# Patient Record
Sex: FEMALE | URBAN - METROPOLITAN AREA
[De-identification: names, ages, dates, MRNs, and addresses within clinical notes are randomized per-mention and may not be internally consistent; named-entity substitution may affect disease eponyms.]

---

## 2024-08-20 ENCOUNTER — OFFICE VISIT (OUTPATIENT)
Dept: URBAN - METROPOLITAN AREA CLINIC 113 | Facility: CLINIC | Age: 69
End: 2024-08-20

## 2024-09-06 ENCOUNTER — OFFICE VISIT (OUTPATIENT)
Dept: URBAN - METROPOLITAN AREA CLINIC 113 | Facility: CLINIC | Age: 69
End: 2024-09-06
Payer: MEDICARE

## 2024-09-06 ENCOUNTER — DASHBOARD ENCOUNTERS (OUTPATIENT)
Age: 69
End: 2024-09-06

## 2024-09-06 ENCOUNTER — LAB OUTSIDE AN ENCOUNTER (OUTPATIENT)
Dept: URBAN - METROPOLITAN AREA CLINIC 113 | Facility: CLINIC | Age: 69
End: 2024-09-06

## 2024-09-06 VITALS
HEART RATE: 67 BPM | HEIGHT: 66 IN | SYSTOLIC BLOOD PRESSURE: 113 MMHG | TEMPERATURE: 97.3 F | WEIGHT: 148 LBS | BODY MASS INDEX: 23.78 KG/M2 | RESPIRATION RATE: 18 BRPM | DIASTOLIC BLOOD PRESSURE: 73 MMHG

## 2024-09-06 DIAGNOSIS — K92.1 HEMATOCHEZIA: ICD-10-CM

## 2024-09-06 DIAGNOSIS — R19.5 POSITIVE FIT (FECAL IMMUNOCHEMICAL TEST): ICD-10-CM

## 2024-09-06 PROBLEM — 449341000124102: Status: ACTIVE | Noted: 2024-09-06

## 2024-09-06 PROBLEM — 59614000: Status: ACTIVE | Noted: 2024-09-06

## 2024-09-06 PROCEDURE — 99204 OFFICE O/P NEW MOD 45 MIN: CPT | Performed by: NURSE PRACTITIONER

## 2024-09-06 RX ORDER — EFAVIRENZ, EMTRICITABINE AND TENOFOVIR DISOPROXIL FUMARATE 600; 200; 300 MG/1; MG/1; MG/1
1 TABLET ON AN EMPTY STOMACH TABLET, FILM COATED ORAL ONCE A DAY
Status: ACTIVE | COMMUNITY

## 2024-09-06 RX ORDER — ATENOLOL 25 MG/1
1 TABLET TABLET ORAL ONCE A DAY
Status: ACTIVE | COMMUNITY

## 2024-09-06 RX ORDER — SODIUM PICOSULFATE, MAGNESIUM OXIDE, AND ANHYDROUS CITRIC ACID 12; 3.5; 1 G/175ML; G/175ML; MG/175ML
175 ML THE FIRST DOSE THE EVENING BEFORE AND SECOND DOSE THE MORNING OF COLONOSCOPY LIQUID ORAL ONCE A DAY
Qty: 350 | OUTPATIENT
Start: 2024-09-06 | End: 2024-09-08

## 2024-09-06 RX ORDER — LOVASTATIN 40 MG/1
1 TABLET WITH THE EVENING MEAL TABLET ORAL ONCE A DAY
Status: ACTIVE | COMMUNITY

## 2024-09-06 RX ORDER — IBUPROFEN 800 MG/1
1 TABLET WITH FOOD OR MILK AS NEEDED TABLET, FILM COATED ORAL
Status: ACTIVE | COMMUNITY

## 2024-09-06 RX ORDER — CETIRIZINE HYDROCHLORIDE 10 MG/1
1 TABLET TABLET, FILM COATED ORAL ONCE A DAY
Status: ACTIVE | COMMUNITY

## 2024-09-06 NOTE — HPI-TODAY'S VISIT:
This is a 68-year-old female with a history of HIV, prediabetes, hyperlipidemia, and chronic pain referred from Ms. Benjamin NP for a positive fit test/colon cancer screening.  She denies a family history of colon cancer. She has not had a colonoscopy. For the last 6 months, she has noticed intermittent bleeding describing red blood on the tissue or darker blood and clots with her bowel movement. She may have passed only blood. This does not occur on a daily basis. She has a bowel movement every morning. Recently, her stools have been loose. For the last 2 months, she has experienced lower abdominal cramps after her bowel movements similar to menstrual cramps. She denies proctalgia, any other abdominal symptoms, denies a weight loss, and denies NSAID use.  She denies a history of smoking-related lung disease/COPD. She is not under a pulmonologist care. She has an occasional cough and occasional wheezing. She denies shortness of breath.

## 2024-09-07 LAB
ABSOLUTE BASOPHILS: 41
ABSOLUTE EOSINOPHILS: 92
ABSOLUTE LYMPHOCYTES: 3080
ABSOLUTE MONOCYTES: 510
ABSOLUTE NEUTROPHILS: 1377
BASOPHILS: 0.8
EOSINOPHILS: 1.8
HEMATOCRIT: 40.6
HEMOGLOBIN: 13.6
LYMPHOCYTES: 60.4
MCH: 33.7
MCHC: 33.5
MCV: 100.5
MONOCYTES: 10
MPV: 9.4
NEUTROPHILS: 27
PLATELET COUNT: 309
RDW: 11.7
RED BLOOD CELL COUNT: 4.04
WHITE BLOOD CELL COUNT: 5.1

## 2024-10-10 ENCOUNTER — OFFICE VISIT (OUTPATIENT)
Dept: URBAN - METROPOLITAN AREA SURGERY CENTER 25 | Facility: SURGERY CENTER | Age: 69
End: 2024-10-10
Payer: MEDICARE

## 2024-10-10 DIAGNOSIS — Z12.11 COLON CANCER SCREENING: ICD-10-CM

## 2024-10-10 DIAGNOSIS — R19.5 ABNORMAL CONSISTENCY OF STOOL: ICD-10-CM

## 2024-10-10 DIAGNOSIS — Z12.11 COLON CANCER SCREENING (HIGH RISK): ICD-10-CM

## 2024-10-10 PROCEDURE — 0528F RCMND FLW-UP 10 YRS DOCD: CPT | Performed by: INTERNAL MEDICINE

## 2024-10-10 PROCEDURE — 00811 ANES LWR INTST NDSC NOS: CPT | Performed by: NURSE ANESTHETIST, CERTIFIED REGISTERED

## 2024-10-10 PROCEDURE — G0121 COLON CA SCRN NOT HI RSK IND: HCPCS | Performed by: INTERNAL MEDICINE

## 2024-10-10 RX ORDER — IBUPROFEN 800 MG/1
1 TABLET WITH FOOD OR MILK AS NEEDED TABLET, FILM COATED ORAL
Status: ACTIVE | COMMUNITY

## 2024-10-10 RX ORDER — LOVASTATIN 40 MG/1
1 TABLET WITH THE EVENING MEAL TABLET ORAL ONCE A DAY
Status: ACTIVE | COMMUNITY

## 2024-10-10 RX ORDER — CETIRIZINE HYDROCHLORIDE 10 MG/1
1 TABLET TABLET, FILM COATED ORAL ONCE A DAY
Status: ACTIVE | COMMUNITY

## 2024-10-10 RX ORDER — EFAVIRENZ, EMTRICITABINE AND TENOFOVIR DISOPROXIL FUMARATE 600; 200; 300 MG/1; MG/1; MG/1
1 TABLET ON AN EMPTY STOMACH TABLET, FILM COATED ORAL ONCE A DAY
Status: ACTIVE | COMMUNITY

## 2024-10-10 RX ORDER — ATENOLOL 25 MG/1
1 TABLET TABLET ORAL ONCE A DAY
Status: ACTIVE | COMMUNITY

## 2024-10-31 ENCOUNTER — OFFICE VISIT (OUTPATIENT)
Dept: URBAN - METROPOLITAN AREA CLINIC 113 | Facility: CLINIC | Age: 69
End: 2024-10-31
Payer: MEDICARE

## 2024-10-31 VITALS
BODY MASS INDEX: 24.65 KG/M2 | WEIGHT: 153.4 LBS | RESPIRATION RATE: 20 BRPM | TEMPERATURE: 97.2 F | DIASTOLIC BLOOD PRESSURE: 78 MMHG | HEIGHT: 66 IN | SYSTOLIC BLOOD PRESSURE: 115 MMHG | HEART RATE: 68 BPM

## 2024-10-31 DIAGNOSIS — K92.1 HEMATOCHEZIA: ICD-10-CM

## 2024-10-31 DIAGNOSIS — K59.09 OTHER CONSTIPATION: ICD-10-CM

## 2024-10-31 DIAGNOSIS — Z12.11 COLON CANCER SCREENING: ICD-10-CM

## 2024-10-31 PROBLEM — 305058001: Status: ACTIVE | Noted: 2024-10-31

## 2024-10-31 PROCEDURE — 99213 OFFICE O/P EST LOW 20 MIN: CPT | Performed by: NURSE PRACTITIONER

## 2024-10-31 RX ORDER — CETIRIZINE HYDROCHLORIDE 10 MG/1
1 TABLET TABLET, FILM COATED ORAL ONCE A DAY
Status: ACTIVE | COMMUNITY

## 2024-10-31 RX ORDER — LOVASTATIN 40 MG/1
1 TABLET WITH THE EVENING MEAL TABLET ORAL ONCE A DAY
Status: ACTIVE | COMMUNITY

## 2024-10-31 RX ORDER — ATENOLOL 25 MG/1
1 TABLET TABLET ORAL ONCE A DAY
Status: ACTIVE | COMMUNITY

## 2024-10-31 RX ORDER — IBUPROFEN 800 MG/1
1 TABLET WITH FOOD OR MILK AS NEEDED TABLET, FILM COATED ORAL
Status: ACTIVE | COMMUNITY

## 2024-10-31 RX ORDER — EFAVIRENZ, EMTRICITABINE AND TENOFOVIR DISOPROXIL FUMARATE 600; 200; 300 MG/1; MG/1; MG/1
1 TABLET ON AN EMPTY STOMACH TABLET, FILM COATED ORAL ONCE A DAY
Status: ACTIVE | COMMUNITY

## 2024-10-31 NOTE — HPI-TODAY'S VISIT:
This is a 68-year-old female with a history of HIV, prediabetes, hyperlipidemia, chronic pain, and positive fit test presenting for follow-up after colonoscopy. She was initially seen 9/6/2024 referred for a positive fit test.  She denied a family history of colon cancer.  She not had a colonoscopy.  She reported intermittent red blood on the tissue and dark blood with clots with her bowel movements occurring over 6 months.  She had also experienced a change in bowel habits described as a daily loose stool and lower abdominal cramps after bowel movements.  A CBC was ordered to assess for evidence of anemia.  She was scheduled for colonoscopy.  Colonoscopy 10/10/2024:BBPS 6 (good prep), normal terminal ileum, normal entire colon, perianal and digital rectal exams normal.  No specimens collected.  Colonoscopy for screening recommended in 10 years. CBC 9/6/2024:WBC 5.1, hemoglobin 13.6, .5, platelet 309.  She did not have a bowel movement after her colonoscopy until today.  She reports abdominal bloating.  She had multiple bowel movements earlier this morning with some urgency.  Bloating has resolved.  She denies abdominal pain.  She has not noticed red blood per rectum since her last visit.  She typically has regular bowel movements.  GERD is controlled with dexlansoprazole 30 mg daily prescribed by her primary care provider.